# Patient Record
(demographics unavailable — no encounter records)

---

## 2025-07-23 NOTE — PHYSICAL EXAM
[de-identified] : Gen: NAD   Resp: Nonlabored respirations, no SOB   Gait: Nl      B/l Knee:   Skin intact  Small effusion   5-120 AROM   tender mjl  5/5 IP Q EHL TA GS   SILT L3-S1  2+ dp pt wwp bcr      1A lachman and (-) pivot shift  Grade 1 posterior drawer   Stable to v/v at 0 and 30 degrees  (-) Dial test at 30, 90 degrees    (-) Melvin, Gabi   1+ patellar translation  (-) pain with patellar compression/grind  (-) J sign  (-) apprehension    [de-identified] : The following radiographs were ordered and read by me during this patient's visit. I reviewed each radiograph in detail with the patient and discussed the findings as highlighted below.   4 views of the L + R knees were obtained today that show no fracture, or dislocation. There is moderate tricompartmental OA. There is no malalignment. No obvious osseous abnormality. Otherwise unremarkable.  adv L med

## 2025-07-23 NOTE — HISTORY OF PRESENT ILLNESS
[de-identified] :  OMAR PINEDA is a 61 year F pw with b/l knee pain Lt>Rt. Pain has been on going for the past 5 years. Pain is located to the medial aspect of the left knee and anterior aspect of the right knee. Pt notes extending sitting and ascending/descending stairs worsens the pain and symptoms.  Does not recall an inciting traumatic event.  Pt has tried activity modification and NSAIDs with minimal relief, pain level remains a 6/10.  Also notes increased stiffness/worse motion in the knee.  Has not trialed physical therapy or injections.  Pt denies numbness, paresthesias, f/c.  (+) clicking  Pt notes the pain interferes with activities of daily life and that overall mobility has been decreased.  They wish to discuss possible treatment options and return to baseline activity and mobility before symptomatic onset.

## 2025-07-23 NOTE — DISCUSSION/SUMMARY
[de-identified] :  OMAR PINEDA is a 61 year F pw with b/l knee OA. The patient was extensively counseled on treatment options including but not limited to observation, rest/activity modification, bracing, anti-inflammatory medications, physical therapy, injections, and surgery. The natural history of the disease was thoroughly explained. We discussed that the majority of the time, this condition can be initially treated conservatively. The risks, benefits, and alternatives to an injection were reviewed with the patient.  Risks outlined include but are not limited to infection, sepsis, bleeding, scarring, temporary increase in pain, syncope, failure to resolve symptoms, symptom recurrence, allergic reaction and a flare.  The patient understood these risks and wished to proceed with an injection, providing verbal consent.  Under sterile conditions using chlorhexidine and an ethyl chloride spray for topic analgesia, an injection of 4cc 1% lidocaine without epinephrine and 1cc 40mg/ml depomedrol was placed in the L knee.  The patient tolerated the procedure well without complication.  The patient was advised to call if redness, pain or fever occur and to apply ice for 15 minutes every hour for the next day as tolerated. -diclofenac rx, HEP/PT -  pt was instructed on the importance of resting, icing and elevating to minimize swelling   I have personally obtained the history, reviewed the ROS as noted, and performed the physical examination today. The patient and I discussed the assessment and options and developed the plan. All questions were answered and the patient stated their understanding of the treatment plan and appreciation of the visit.  My cumulative time spent on this patient's visit included: Preparation for the visit, review of the medical records, review of pertinent diagnostic studies, examination and counseling of the patient on the above diagnosis, treatment plan and prognosis, orders of diagnostic tests, medications and/or appropriate procedures and documentation in the medical records of today's visit.  Phil Ahumada MD.